# Patient Record
Sex: MALE | Race: WHITE | NOT HISPANIC OR LATINO | Employment: FULL TIME | URBAN - METROPOLITAN AREA
[De-identification: names, ages, dates, MRNs, and addresses within clinical notes are randomized per-mention and may not be internally consistent; named-entity substitution may affect disease eponyms.]

---

## 2017-02-11 ENCOUNTER — HOSPITAL ENCOUNTER (EMERGENCY)
Facility: MEDICAL CENTER | Age: 27
End: 2017-02-16
Attending: EMERGENCY MEDICINE
Payer: MEDICAID

## 2017-02-11 DIAGNOSIS — T14.91XA SUICIDE ATTEMPT (HCC): ICD-10-CM

## 2017-02-11 LAB
AMPHET UR QL SCN: NEGATIVE
BARBITURATES UR QL SCN: NEGATIVE
BENZODIAZ UR QL SCN: NEGATIVE
BZE UR QL SCN: NEGATIVE
CANNABINOIDS UR QL SCN: NEGATIVE
MDMA UR QL SCN: NEGATIVE
METHADONE UR QL SCN: NEGATIVE
OPIATES UR QL SCN: NEGATIVE
OXYCODONE UR QL SCN: NEGATIVE
PCP UR QL SCN: NEGATIVE
POC BREATHALIZER: 0.03 PERCENT (ref 0–0.01)
PROPOXYPH UR QL SCN: NEGATIVE

## 2017-02-11 PROCEDURE — 80307 DRUG TEST PRSMV CHEM ANLYZR: CPT

## 2017-02-11 PROCEDURE — A9270 NON-COVERED ITEM OR SERVICE: HCPCS | Performed by: EMERGENCY MEDICINE

## 2017-02-11 PROCEDURE — 90791 PSYCH DIAGNOSTIC EVALUATION: CPT

## 2017-02-11 PROCEDURE — 700102 HCHG RX REV CODE 250 W/ 637 OVERRIDE(OP): Performed by: EMERGENCY MEDICINE

## 2017-02-11 PROCEDURE — 99285 EMERGENCY DEPT VISIT HI MDM: CPT

## 2017-02-11 PROCEDURE — 302970 POC BREATHALIZER

## 2017-02-11 RX ORDER — NICOTINE 21 MG/24HR
1 PATCH, TRANSDERMAL 24 HOURS TRANSDERMAL ONCE
Status: COMPLETED | OUTPATIENT
Start: 2017-02-11 | End: 2017-02-12

## 2017-02-11 RX ORDER — LIDOCAINE HYDROCHLORIDE AND EPINEPHRINE BITARTRATE 20; .01 MG/ML; MG/ML
10 INJECTION, SOLUTION SUBCUTANEOUS ONCE
Status: ACTIVE | OUTPATIENT
Start: 2017-02-11 | End: 2017-02-12

## 2017-02-11 RX ORDER — LORAZEPAM 1 MG/1
1 TABLET ORAL ONCE
Status: COMPLETED | OUTPATIENT
Start: 2017-02-11 | End: 2017-02-11

## 2017-02-11 RX ADMIN — NICOTINE 1 PATCH: 21 PATCH TRANSDERMAL at 16:02

## 2017-02-11 RX ADMIN — LORAZEPAM 1 MG: 1 TABLET ORAL at 12:52

## 2017-02-11 NOTE — ED AVS SNAPSHOT
Home Care Instructions                                                                                                                Ricardo Finley   MRN: [id number]    Department:  [facility], Emergency Dept   Date of Visit:  2/11/2017            [facility], Emergency Dept    [address]    [place] [address]    Phone:  [phone]      You were seen by     1. Yuniel Blancas M.D.    2. Shashank Hernandez M.D.    3. Sagar Escalona M.D.    4. Rashaun Smith M.D.    5. Luis Edmonds M.D.    6. Hermann Bravo M.D.    7. JESUS CantuOWilmer    8. Alexi Nieto M.D.    9. JESUS EasleyO.    10. Sheila Dejesus M.D.    11. Jonathan Sierra D.O.    12. Juancho Lira M.D.      Your Diagnosis Was     Suicide attempt ([facility])     T14.91       These are the medications you received during your hospitalization from 02/11/2017 0327 to 02/16/2017 1537     Date/Time Order Dose Route Action    02/11/2017 0430 lidocaine-epinephrine 2 %-1:[id number] injection 10 mL 10 mL Injection Refused    02/11/2017 1252 lorazepam (ATIVAN) tablet 1 mg 1 mg Oral Given      Medication Information     Review all of your home medications and newly ordered medications with your primary doctor and/or pharmacist as soon as possible. Follow medication instructions as directed by your doctor and/or pharmacist.     Please keep your complete medication list with you and share with your physician. Update the information when medications are discontinued, doses are changed, or new medications (including over-the-counter products) are added; and carry medication information at all times in the event of emergency situations.               Medication List      Notice     You have not been prescribed any medications.            Procedures and tests performed during your visit     Procedure/Test Number of Times Performed    IP CONSULT TO  2    [employer] consult (when BA is below .08)  1    NOTIFY ADMITTING  OF SUICIDE RISK 1    NURSING COMMUNICATION 1    No Visitors 1    Offer patient opportunity to perform ADLs daily 1    Offer same diet options as would be offered to the inpatient population 1    POC BREATHALIZER 1    RN to Round on Patient every 2 hours 1    Transfer patient to [facility] under direct observation 1    URINE DRUG SCREEN (TRIAGE) 1    Vital Signs every 4 hours 1            Patient Information     Patient Information    Following emergency treatment: all patient requiring follow-up care must return either to a private physician or a clinic if your condition worsens before you are able to obtain further medical attention, please return to the emergency room.     Billing Information    At [facility], we work to make the billing process streamlined for our patients.  Our Representatives are here to answer any questions you may have regarding your hospital bill.  If you have insurance coverage and have supplied your insurance information to us, we will submit a claim to your insurer on your behalf.  Should you have any questions regarding your bill, we can be reached online or by phone as follows:  Online: You are able pay your bills online or live chat with our representatives about any billing questions you may have. We are here to help Monday - Friday from 8:00am to 7:30pm and 9:00am - 12:00pm on Saturdays.  Please visit [email or web address]  for more information.   Phone:  [phone] or [phone]    Please note that your emergency physician, surgeon, pathologist, radiologist, anesthesiologist, and other specialists are not employed by [facility] and will therefore bill separately for their services.  Please contact them directly for any questions concerning their bills at the numbers below:     Emergency Physician Services:  [phone]  [facility]:  [phone]  [facility]:  [phone]  [facility]:  [phone]    1. Your final bill may vary from the amount quoted upon discharge if all procedures are not complete at that time, or if your doctor has additional procedures of which we are not aware. You will receive an additional bill if you return to the Emergency Department at [facility] for suture removal regardless of the facility of which the sutures were placed.     2. Please arrange for settlement of this account at the emergency registration.    3. All self-pay accounts are due in full at the time of treatment.  If you are unable to meet this obligation then payment is expected within 4-5 days.     4. If you have had radiology studies (CT, X-ray, Ultrasound, MRI), you have received a preliminary result during your emergency department visit. Please contact the radiology department [phone] to receive a copy of your final result. Please discuss the Final result with your primary physician or with the follow up physician provided.     Crisis Hotline:  [place] Crisis Hotline:  2-051-WYHHMSF or 1-723.770.6722  [place] Crisis Hotline:    1-827.952.5917 or 678-023-9142         ED Discharge Follow Up Questions    1. In order to provide you with very good care, we would like to follow up with a phone call in the next few days.  May we have your permission to contact you?     YES /  NO    2. What is the best phone number to call you? (       )_____-__________    3. What is the best time to call you?      Morning  /  Afternoon  /  Evening                   Patient Signature:  ____________________________________________________________    Date:  ____________________________________________________________
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
## 2017-02-11 NOTE — DISCHARGE PLANNING
Medical Social Work    Referral: Legal Hold    Intervention: Legal Hold Paperwork given to SW by [employer] RN: Lucas    Legal Hold Initiated: Date: 02.11.2017  Time: 0333    Patient’s Insurance Listed on Face Sheet: [facility]    Referrals sent to: [place], [facility]    Plan: Patient will transfer to mental health facility once acceptance is obtained.

## 2017-02-11 NOTE — ED NOTES
Patient continues resting in bed with eyes closed, awakens easily to voice, no needs at this time, sitter remains outside room.

## 2017-02-11 NOTE — ED AVS SNAPSHOT
2/16/2017          Ricardo Finley  [address]  [place] [address]    Dear Ricardo:    [facility] wants to ensure your discharge home is safe and you or your loved ones have had all your questions answered regarding your care after you leave the hospital.    You may receive a telephone call within two days of your discharge.  This call is to make certain you understand your discharge instructions as well as ensure we provided you with the best care possible during your stay with us.     The call will only last approximately 3-5 minutes and will be done by a nurse.    Once again, we want to ensure your discharge home is safe and that you have a clear understanding of any next steps in your care.  If you have any questions or concerns, please do not hesitate to contact us, we are here for you.  Thank you for choosing [facility] for your healthcare needs.    Sincerely,    Kendrick Vieira    [facility]

## 2017-02-11 NOTE — ED AVS SNAPSHOT
[employer] Access Code: [email or web address]  Expires: 3/18/2017  3:36 PM    Your email address is not on file at [facility].  Email Addresses are required for you to sign up for [employer], please contact [phone] to verify your personal information and to provide your email address prior to attempting to register for [employer].    Ricardo Finley  [address]  [place] [address]    Tele[employer] Management  A secure, online tool to manage your health information     [employer]’s Tele[employer] Transport Management® is a secure, online tool that connects you to your personalized health information from the privacy of your home -- day or night - making it very easy for you to manage your healthcare. Once the activation process is completed, you can even access your medical information using the Telecom Transport Management andrew, which is available for free in the Apple Andrew store or Google Play store.     To learn more about Telecom Transport Management, visit [email or web address]    There are two levels of access available (as shown below):   My Chart Features  [facility] Primary Care Doctor [facility]  Specialists [facility]  Urgent  Care Non-[facility] Primary Care Doctor   Email your healthcare team securely and privately 24/7 X X X    Manage appointments: schedule your next appointment; view details of past/upcoming appointments X      Request prescription refills. X      View recent personal medical records, including lab and immunizations X X X X   View health record, including health history, allergies, medications X X X X   Read reports about your outpatient visits, procedures, consult and ER notes X X X X   See your discharge summary, which is a recap of your hospital and/or ER visit that includes your diagnosis, lab results, and care plan X X  X     How to register for [employer] Management:  Once your e-mail address has been verified, follow the following steps to sign up for Tele[employer] Management.     1. Go to  [email or web address]  2. Click on the Sign Up Now box, which takes you to the New Member Sign Up page. You will  need to provide the following information:  a. Enter your [employer] Access Code exactly as it appears at the top of this page. (You will not need to use this code after you’ve completed the sign-up process. If you do not sign up before the expiration date, you must request a new code.)   b. Enter your date of birth.   c. Enter your home email address.   d. Click Submit, and follow the next screen’s instructions.  3. Create a [employer] ID. This will be your [employer] login ID and cannot be changed, so think of one that is secure and easy to remember.  4. Create a [employer] password. You can change your password at any time.  5. Enter your Password Reset Question and Answer. This can be used at a later time if you forget your password.   6. Enter your e-mail address. This allows you to receive e-mail notifications when new information is available in [employer].  7. Click Sign Up. You can now view your health information.    For assistance activating your [employer] account, call [phone]
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
## 2017-02-11 NOTE — ED NOTES
Patient continues resting in bed with eyes closed, awakens easily to voice, no needs at this time, declines meal, sitter remains outside room.

## 2017-02-11 NOTE — ED NOTES
Father called this morning stating concerns of pt calling his COLBY Acosta.  Reassured father that the pt is on a legal hold and will not be able to make any phone calls but will notify oncoming shift.

## 2017-02-11 NOTE — ED NOTES
Break RN  Pt resting in bed, sitter outside the room. Respiration unlabored. No new complaints made. Will continue to monitor.
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
## 2017-02-12 ASSESSMENT — LIFESTYLE VARIABLES
DO YOU DRINK ALCOHOL: YES
TOTAL SCORE: 0
EVER HAD A DRINK FIRST THING IN THE MORNING TO STEADY YOUR NERVES TO GET RID OF A HANGOVER: NO
TOTAL SCORE: 0
HAVE YOU EVER FELT YOU SHOULD CUT DOWN ON YOUR DRINKING: NO
TOTAL SCORE: 0
HAVE PEOPLE ANNOYED YOU BY CRITICIZING YOUR DRINKING: NO
EVER FELT BAD OR GUILTY ABOUT YOUR DRINKING: NO
CONSUMPTION TOTAL: INCOMPLETE

## 2017-02-12 ASSESSMENT — PAIN SCALES - GENERAL
PAINLEVEL_OUTOF10: 0
PAINLEVEL_OUTOF10: 0

## 2017-02-12 NOTE — ED NOTES
Patient father called and states that he has proof through text messages that patient threatened to kill himself to family members and friends.  States he can be contacted if he needs to provide this evidence.

## 2017-02-12 NOTE — ED NOTES
Pt resting in room at this time.  Staring out to floor.  States he doesn't understand why he is here.  Pt provided with juice as requested at this time.  Continue to monitor.

## 2017-02-12 NOTE — ED PROVIDER NOTES
ED PROVIDER NOTE    Scribed for Luis Edmonds M.D. by Sandra Lemos. 2/12/2017, 7:26 AM.    This is an addendum to the note on Ricardo Finley. For further details and full chart entry, see the previously signed ED Provider Note written by Dr. Blancas (ERP).      5:38 AM - I discussed the patient's case with Dr. Smith (ERP) who will transfer care of the patient to me at this time.        The patient continues to wake transfer to an inpatient psychiatric facility.    DISPOSITION:  Patient will be signed out to the oncoming physician pending transfer to the inpatient psychiatric facility.     FINAL IMPRESSION   1. Suicide attempt (CMS-HCC)      I, Sandra Lemos (Scribe), am scribing for, and in the presence of, Luis Edmonds M.D..    Electronically signed by: Sandra Lemos (Scribe), 2/12/2017    ILuis M.D. personally performed the services described in this documentation, as scribed by Sandra Lemos in my presence, and it is both accurate and complete.    The note accurately reflects work and decisions made by me.  Luis Edmonds  2/12/2017  11:19 AM

## 2017-02-12 NOTE — ED NOTES
Patient given remote for TV.  Allowed to call work to let them know he wont be there tomorrow.  Vitals obtained.  Wants us to know that he was never suicidal and everyone is making stuff up against him.  States that cutting himself was an accident, Denies further needs at this time

## 2017-02-12 NOTE — ED NOTES
Patient vitals updated.  Patient refuses breakfast.  Denies needs at this time.  Is resting quietly in bed.

## 2017-02-12 NOTE — ED NOTES
Dressing to left wrist changed, patient asking to be walked outside to smoke a cigarette, educated on hospital smoking policy, Nicotine patch offered, ERP notified and orders received, Nicotine patch to left shoulder.

## 2017-02-12 NOTE — ED NOTES
Mother called at this time.  Patient states okay to update on status.  Patient now talking to mother on phone.

## 2017-02-13 NOTE — ED NOTES
Patient resting on gurney. No acute distress. Active chest rise and fall noted. No restlessness/agitation noted. Close observation remains in progress.

## 2017-02-13 NOTE — ED PROVIDER NOTES
ED Provider Note Addendum    Scribed for Luis Edmonds M.D. by Brandie Ackerman on 2/13/2017 at 10:52 AM.     This is an addendum to the note on Ricardo Finley.  For further details and full chart information, see patient's initial note.       4:09 AM - I discussed the patient's case with Dr. Lopez ([facility]) who will transfer care of the patient to me at this time.        11:31 AM - The patient has done well during my period of observation. They are resting comfortably. They continue to await transfer to an inpatient psychiatric facility.     Disposition:  Patient will be transferred to an appropriate psychiatric facility in stable condition for further psychiatric care and evaluation.  Patient will be placed under the care of my partner awaiting transfer.    FINAL IMPRESSION  1. Suicide attempt ([facility])         Brandie SALVADOR (Scribe), am scribing for, and in the presence of, Luis Edmonds M.D..    Electronically signed by: Brandie Ackerman (Scribe), 2/13/2017    ILuis M.D. personally performed the services described in this documentation, as scribed by Brandie Ackerman in my presence, and it is both accurate and complete.    The note accurately reflects work and decisions made by me.  Luis Edmonds  2/13/2017  11:22 AM

## 2017-02-13 NOTE — ED NOTES
Pt amb to br and back to room, states does not want to be in room rt now; sitter instructed pt that pt had to stay in his room; pt went back to his room w/o difficulty

## 2017-02-13 NOTE — ED NOTES
Patient resting on gurney. No acute distress. Calm and cooperative. Conversing appropriately with staff. No restlessness/agitation noted. Close observation maintained.

## 2017-02-13 NOTE — DISCHARGE PLANNING
Faxed ERP note to [facility] at their request. Fax confirmation receipt received.
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
## 2017-02-14 NOTE — DISCHARGE PLANNING
Received order for extension of legal hold. Faxed Order, Face Sheet, and legal hold to 's office. Awaiting filed copy of extension from court.

## 2017-02-14 NOTE — ED NOTES
Patient resting on gurney. No acute distress. No agitation/restlessness noted. Calm and cooperative. Appropriately conversing with staff. Close observation maintained.

## 2017-02-14 NOTE — ED NOTES
As per Dr. Lopez patient OK to be transferred to [facility]. Discussed with Charge SONIA Delacruz. Security called to transfer patient.

## 2017-02-14 NOTE — ED PROVIDER NOTES
ED Provider Note Addendum    Scribed for Luis Edmonds M.D. by Rashaun Silva on 2/14/2017 at 11:15 AM.     This is an addendum to the note on Ricardo Finley.  For further details and full chart information, see patient's initial note.       4:00 AM - I discussed the patient's case with Dr. Lopez ([facility]) who will transfer care of the patient to me at this time.        11:15 AM  - The patient has done well during my period of observation. They are resting comfortably. They continue to await transfer to an inpatient psychiatric facility.     Disposition:  Patient will be transferred to an appropriate psychiatric facility in stable condition for further psychiatric care and evaluation.  Patient will be placed under the care of my partner awaiting transfer.    FINAL IMPRESSION  1. Suicide attempt (CMS-[facility])         Rashaun SALVADOR (Scribe), am scribing for, and in the presence of, Luis Edmonds M.D..    Electronically signed by: Rashaun Silva (Scribe), 2/14/2017    Luis SALVADOR M.D. personally performed the services described in this documentation, as scribed by Rashaun Silva in my presence, and it is both accurate and complete.    The note accurately reflects work and decisions made by me.  Lusi Edmonds  2/14/2017  12:05 PM

## 2017-02-15 NOTE — ED PROVIDER NOTES
I evaluated the patient. He does not have any complaints. His left wrist wound is healing well. We will proceed with plan for psychiatric care.

## 2017-02-15 NOTE — ED NOTES
Pt resting in gurney staring at ceiling. Resp are even and unlabored. NAD. CNA, ED Tech, and security in place.

## 2017-02-15 NOTE — ED NOTES
Pt resting in gurney watching TV. Resp are even and unlabored. Pt is calm and cooperative. NAD. CNA, ED tech , and Security in place.

## 2017-02-15 NOTE — ED NOTES
Pt sleeping with visible rise and fall of chest. Resp are even and unlabored. NAD. CNA, ED tech and security in place.

## 2017-02-15 NOTE — DISCHARGE PLANNING
Pt has requested to meet with Court MDs today to have Legal Hold discontinued. Awaiting schedule for court this afternoon.

## 2017-02-16 VITALS
DIASTOLIC BLOOD PRESSURE: 79 MMHG | OXYGEN SATURATION: 92 % | HEART RATE: 88 BPM | SYSTOLIC BLOOD PRESSURE: 142 MMHG | RESPIRATION RATE: 18 BRPM | BODY MASS INDEX: 16.78 KG/M2 | HEIGHT: 75 IN | TEMPERATURE: 98.3 F | WEIGHT: 135 LBS

## 2017-02-16 NOTE — ED PROVIDER NOTES
Patient is in the emergency department with a legal hold. He was evaluated by psychiatry and his hold was extended. He went to the court yesterday and I am told that his legal hold was to be lifted. We have not received any paperwork or confirmation of this. I spoke with the  who will contact the  to ensure this is the case. He does not have any medical complaints. He tells me he is not suicidal at this time.

## 2017-02-16 NOTE — ED PROVIDER NOTES
ED PROVIDER NOTE    Scribed for Juancho Lira M.D. by Yumiko Hdz. 2/16/2017, 3:35 PM.    This is an addendum to the note on Ricardo Finley. For further details and full chart entry, see the previously signed ED Provider Note written by Dr. Blancas ([facility]).     1:23 PM - Patient's case was transferred into my care by Dr. Blancas. See previously signed note for further details.     3:36 PM Nursing staff informed me the patient's legal hold was discontinued after psychiatric evaluation. He will be discharged.    The patient will return for new or worsening symptoms and is stable at the time of discharge.    The patient is referred to a primary physician for blood pressure management, diabetic screening, and for all other preventative health concerns.    DISPOSITION:  Patient will be discharged home in stable condition.    FINAL IMPRESSION   1. Suicide attempt (CMS-[facility])      Yumiko SALVADOR (Scribe), am scribing for, and in the presence of, Juancho Lira M.D..    Electronically signed by: Yumiko Hdz (Beny), 2/16/2017    Juancho SALVADOR M.D. personally performed the services described in this documentation, as scribed by Yumiko Hdz in my presence, and it is both accurate and complete.    The note accurately reflects work and decisions made by me.  Juancho Lira  2/16/2017  4:35 PM

## 2017-02-16 NOTE — DISCHARGE PLANNING
SW supervisor reached out to 's office regarding filing of court paperwork from yesterday's examination. Court paperwork is not yet complete, but 's office will provide to [facility] as soon as available.

## 2017-02-16 NOTE — ED NOTES
Patient calm cooperative, statesn court yesterday was released [facility] a [facility], note indicates awaiting paperwork from court

## 2017-02-16 NOTE — DISCHARGE PLANNING
MSW received court paperwork stating pt's hold was discontinued. MSW alerted bedside RN that pt may discharge. .

## 2017-02-16 NOTE — ED NOTES
Report received from [employer].  Pt currently sitting in bed watching tv.  No needs at this time.

## 2017-02-16 NOTE — ED NOTES
[facility] called, court has removed legal hold, Dr Lira aware

## 2017-02-16 NOTE — DISCHARGE PLANNING
Medical Social Work    Pt presented to [employer] through [employer]. Pt requested to be released from Legal Hold. MDs agreed with pt and will ask court to d/c Legal Hold. Pt to be allowed to contact programs for treatment while waiting for court order. Order expected to be signed Thursday or Friday a.m.     Legal Hold expected to be d/c by Court tomorrow.

## 2017-02-17 NOTE — ED NOTES
PATIENT DISCHARGED W INSTRUCTIONS AND FOLLOW UP, ALL BELONGINGS RETURNED, PER PATIENT ALL THERE, NO QUESTIONS AT THIS TIME. CHRISTELLE FOR DISCHARGE

## 2018-08-16 ENCOUNTER — HOSPITAL ENCOUNTER (EMERGENCY)
Dept: HOSPITAL 8 - ED | Age: 28
Discharge: HOME | End: 2018-08-16
Payer: COMMERCIAL

## 2018-08-16 VITALS — DIASTOLIC BLOOD PRESSURE: 85 MMHG | SYSTOLIC BLOOD PRESSURE: 135 MMHG

## 2018-08-16 VITALS — HEIGHT: 77 IN | BODY MASS INDEX: 19.11 KG/M2 | WEIGHT: 161.82 LBS

## 2018-08-16 DIAGNOSIS — K29.20: Primary | ICD-10-CM

## 2018-08-16 DIAGNOSIS — R00.2: ICD-10-CM

## 2018-08-16 LAB
ALBUMIN SERPL-MCNC: 4.4 G/DL (ref 3.4–5)
ALP SERPL-CCNC: 45 U/L (ref 45–117)
ALT SERPL-CCNC: 74 U/L (ref 12–78)
ANION GAP SERPL CALC-SCNC: 8 MMOL/L (ref 5–15)
BASOPHILS # BLD AUTO: 0.01 X10^3/UL (ref 0–0.1)
BASOPHILS NFR BLD AUTO: 0 % (ref 0–1)
BILIRUB SERPL-MCNC: 1.1 MG/DL (ref 0.2–1)
CALCIUM SERPL-MCNC: 9 MG/DL (ref 8.5–10.1)
CHLORIDE SERPL-SCNC: 105 MMOL/L (ref 98–107)
CREAT SERPL-MCNC: 0.91 MG/DL (ref 0.7–1.3)
EOSINOPHIL # BLD AUTO: 0.07 X10^3/UL (ref 0–0.4)
EOSINOPHIL NFR BLD AUTO: 2 % (ref 1–7)
ERYTHROCYTE [DISTWIDTH] IN BLOOD BY AUTOMATED COUNT: 13.4 % (ref 9.4–14.8)
LYMPHOCYTES # BLD AUTO: 1.17 X10^3/UL (ref 1–3.4)
LYMPHOCYTES NFR BLD AUTO: 36 % (ref 22–44)
MCH RBC QN AUTO: 34.1 PG (ref 27.5–34.5)
MCHC RBC AUTO-ENTMCNC: 35.2 G/DL (ref 33.2–36.2)
MCV RBC AUTO: 96.9 FL (ref 81–97)
MD: NO
MONOCYTES # BLD AUTO: 0.34 X10^3/UL (ref 0.2–0.8)
MONOCYTES NFR BLD AUTO: 11 % (ref 2–9)
NEUTROPHILS # BLD AUTO: 1.66 X10^3/UL (ref 1.8–6.8)
NEUTROPHILS NFR BLD AUTO: 51 % (ref 42–75)
PLATELET # BLD AUTO: 239 X10^3/UL (ref 130–400)
PMV BLD AUTO: 7.6 FL (ref 7.4–10.4)
PROT SERPL-MCNC: 7.8 G/DL (ref 6.4–8.2)
RBC # BLD AUTO: 5.24 X10^6/UL (ref 4.38–5.82)

## 2018-08-16 PROCEDURE — 83690 ASSAY OF LIPASE: CPT

## 2018-08-16 PROCEDURE — 85025 COMPLETE CBC W/AUTO DIFF WBC: CPT

## 2018-08-16 PROCEDURE — 36415 COLL VENOUS BLD VENIPUNCTURE: CPT

## 2018-08-16 PROCEDURE — 71045 X-RAY EXAM CHEST 1 VIEW: CPT

## 2018-08-16 PROCEDURE — 93005 ELECTROCARDIOGRAM TRACING: CPT

## 2018-08-16 PROCEDURE — 80053 COMPREHEN METABOLIC PANEL: CPT

## 2018-08-16 PROCEDURE — 99285 EMERGENCY DEPT VISIT HI MDM: CPT

## 2019-02-14 ENCOUNTER — HOSPITAL ENCOUNTER (EMERGENCY)
Dept: HOSPITAL 8 - ED | Age: 29
Discharge: HOME | End: 2019-02-14
Payer: SELF-PAY

## 2019-02-14 VITALS — SYSTOLIC BLOOD PRESSURE: 146 MMHG | DIASTOLIC BLOOD PRESSURE: 86 MMHG

## 2019-02-14 VITALS — WEIGHT: 166.23 LBS | BODY MASS INDEX: 19.63 KG/M2 | HEIGHT: 77 IN

## 2019-02-14 DIAGNOSIS — R10.9: ICD-10-CM

## 2019-02-14 DIAGNOSIS — R19.7: ICD-10-CM

## 2019-02-14 DIAGNOSIS — Z87.19: ICD-10-CM

## 2019-02-14 DIAGNOSIS — R11.2: Primary | ICD-10-CM

## 2019-02-14 LAB
ALBUMIN SERPL-MCNC: 4.1 G/DL (ref 3.4–5)
ALP SERPL-CCNC: 48 U/L (ref 45–117)
ALT SERPL-CCNC: 74 U/L (ref 12–78)
ANION GAP SERPL CALC-SCNC: 6 MMOL/L (ref 5–15)
BASOPHILS # BLD AUTO: 0.02 X10^3/UL (ref 0–0.1)
BASOPHILS NFR BLD AUTO: 1 % (ref 0–1)
BILIRUB SERPL-MCNC: 1 MG/DL (ref 0.2–1)
CALCIUM SERPL-MCNC: 8.9 MG/DL (ref 8.5–10.1)
CHLORIDE SERPL-SCNC: 106 MMOL/L (ref 98–107)
CREAT SERPL-MCNC: 0.86 MG/DL (ref 0.7–1.3)
EOSINOPHIL # BLD AUTO: 0.02 X10^3/UL (ref 0–0.4)
EOSINOPHIL NFR BLD AUTO: 1 % (ref 1–7)
ERYTHROCYTE [DISTWIDTH] IN BLOOD BY AUTOMATED COUNT: 13.7 % (ref 9.4–14.8)
LYMPHOCYTES # BLD AUTO: 0.94 X10^3/UL (ref 1–3.4)
LYMPHOCYTES NFR BLD AUTO: 23 % (ref 22–44)
MCH RBC QN AUTO: 34.4 PG (ref 27.5–34.5)
MCHC RBC AUTO-ENTMCNC: 34.6 G/DL (ref 33.2–36.2)
MCV RBC AUTO: 99.4 FL (ref 81–97)
MD: NO
MONOCYTES # BLD AUTO: 0.46 X10^3/UL (ref 0.2–0.8)
MONOCYTES NFR BLD AUTO: 11 % (ref 2–9)
NEUTROPHILS # BLD AUTO: 2.67 X10^3/UL (ref 1.8–6.8)
NEUTROPHILS NFR BLD AUTO: 65 % (ref 42–75)
PLATELET # BLD AUTO: 227 X10^3/UL (ref 130–400)
PMV BLD AUTO: 8.2 FL (ref 7.4–10.4)
PROT SERPL-MCNC: 7.5 G/DL (ref 6.4–8.2)
RBC # BLD AUTO: 5.12 X10^6/UL (ref 4.38–5.82)

## 2019-02-14 PROCEDURE — 85025 COMPLETE CBC W/AUTO DIFF WBC: CPT

## 2019-02-14 PROCEDURE — 74022 RADEX COMPL AQT ABD SERIES: CPT

## 2019-02-14 PROCEDURE — 83690 ASSAY OF LIPASE: CPT

## 2019-02-14 PROCEDURE — 80053 COMPREHEN METABOLIC PANEL: CPT

## 2019-02-14 PROCEDURE — 99284 EMERGENCY DEPT VISIT MOD MDM: CPT

## 2019-02-14 PROCEDURE — 36415 COLL VENOUS BLD VENIPUNCTURE: CPT

## 2019-02-14 NOTE — NUR
PT. IS A & O X 4 WITH C/O NAUSEA AND VOMITING SINCE TUESDAY.  PT. REPORTS THAT 
THERE IS BLOOD IN HIS VOMIT.  PT. IS PINK, WARM AND DRY.  LUNGS ARE CTA.  MM 
ARE PINK AND MOIST WITH PULSES +2 THROUGHOUT.  PT. IS RESTING WITHOUT CONCERNS. 
 LABS DRAWN AND SENT.